# Patient Record
Sex: FEMALE | URBAN - METROPOLITAN AREA
[De-identification: names, ages, dates, MRNs, and addresses within clinical notes are randomized per-mention and may not be internally consistent; named-entity substitution may affect disease eponyms.]

---

## 2020-10-14 ENCOUNTER — MEDICAL CORRESPONDENCE (OUTPATIENT)
Dept: HEALTH INFORMATION MANAGEMENT | Facility: CLINIC | Age: 35
End: 2020-10-14

## 2020-10-14 ENCOUNTER — TRANSFERRED RECORDS (OUTPATIENT)
Dept: HEALTH INFORMATION MANAGEMENT | Facility: CLINIC | Age: 35
End: 2020-10-14

## 2020-10-15 ENCOUNTER — APPOINTMENT (OUTPATIENT)
Dept: INTERPRETER SERVICES | Facility: CLINIC | Age: 35
End: 2020-10-15
Payer: COMMERCIAL

## 2020-11-03 ENCOUNTER — HOSPITAL ENCOUNTER (OUTPATIENT)
Dept: CARDIOLOGY | Facility: CLINIC | Age: 35
Discharge: HOME | End: 2020-11-03
Admitting: PEDIATRICS
Payer: COMMERCIAL

## 2020-11-03 DIAGNOSIS — O26.90 PREGNANCY RELATED CONDITION, ANTEPARTUM: ICD-10-CM

## 2020-11-03 PROCEDURE — 76825 ECHO EXAM OF FETAL HEART: CPT | Mod: 26 | Performed by: PEDIATRICS

## 2020-11-03 PROCEDURE — 76827 ECHO EXAM OF FETAL HEART: CPT | Mod: 26 | Performed by: PEDIATRICS

## 2020-11-03 PROCEDURE — 93325 DOPPLER ECHO COLOR FLOW MAPG: CPT | Mod: 26 | Performed by: PEDIATRICS

## 2020-11-03 PROCEDURE — 76825 ECHO EXAM OF FETAL HEART: CPT

## 2020-11-03 PROCEDURE — 99201 PR OFFICE/OUTPT VISIT, NEW, LEVEL I: CPT | Mod: 25 | Performed by: PEDIATRICS

## 2020-11-03 NOTE — PROGRESS NOTES
[facility]   [facility] Fetal Consult Note    Patient:  Jennie Orr MRN:  [id number]   YOB: 1985 Age:  34 year old   Date of Visit:  11/3/2020 PCP:  No primary care provider on file.     Dear Physician,     I had the pleasure of seeing Jennie Orr at the [facility] on 11/3/2020 in fetal cardiology consultation for fetal echocardiogram results. She presented today accompanied by herself. As you know, she is a 34 year old No obstetric history on file. at Unknown who presented for fetal echocardiogram today because of pre-gestational diabetes mellitus.    I performed and interpreted the fetal echocardiogram today, which demonstrated likely normal fetal echocardiogram. Normal fetal intracardiac connections. Normal right and left ventricular size and function. Regular fetal heart rate at 150 bpm. No hydops.     I reviewed the echo findings today with Jennie Orr. Although this was a technically difficult study, the patient is aware that no obvious cardiac abnormalities were identified. She is aware of the general limitations of fetal echocardiography. No additional fetal echocardiograms are recommended. No  cardiac follow-up is required.     Thank you for allowing me to participate in Jennie's care. Please do not hesitate to contact me with questions or concerns.    This visit was separate from the performance and interpretation of the ultrasound. The majority of the time (>50%) was spent in counseling and coordination of care. I spent approximately 15 minutes in face-to-face time reviewing the above considerations.    Ger Garcia M.D.  Pediatric Cardiology  [address], 5th floor, [facility] [address]  Phone [phone]  Fax [phone]

## 2024-10-01 PROCEDURE — 88305 TISSUE EXAM BY PATHOLOGIST: CPT | Performed by: STUDENT IN AN ORGANIZED HEALTH CARE EDUCATION/TRAINING PROGRAM

## 2024-10-01 PROCEDURE — 88305 TISSUE EXAM BY PATHOLOGIST: CPT | Mod: TC,ORL | Performed by: PLASTIC SURGERY

## 2024-10-02 ENCOUNTER — LAB REQUISITION (OUTPATIENT)
Dept: LAB | Facility: CLINIC | Age: 39
End: 2024-10-02

## 2024-10-03 LAB
PATH REPORT.COMMENTS IMP SPEC: NORMAL
PATH REPORT.COMMENTS IMP SPEC: NORMAL
PATH REPORT.FINAL DX SPEC: NORMAL
PATH REPORT.GROSS SPEC: NORMAL
PATH REPORT.MICROSCOPIC SPEC OTHER STN: NORMAL
PATH REPORT.RELEVANT HX SPEC: NORMAL
PHOTO IMAGE: NORMAL